# Patient Record
Sex: FEMALE | Race: WHITE | RURAL
[De-identification: names, ages, dates, MRNs, and addresses within clinical notes are randomized per-mention and may not be internally consistent; named-entity substitution may affect disease eponyms.]

---

## 2019-11-21 ENCOUNTER — OFFICE VISIT CONVERTED (OUTPATIENT)
Dept: CARDIOLOGY | Facility: CLINIC | Age: 74
End: 2019-11-21
Attending: SPECIALIST

## 2019-11-21 ENCOUNTER — CONVERSION ENCOUNTER (OUTPATIENT)
Dept: CARDIOLOGY | Facility: CLINIC | Age: 74
End: 2019-11-21

## 2020-01-17 ENCOUNTER — HOSPITAL ENCOUNTER (OUTPATIENT)
Dept: NUCLEAR MEDICINE | Facility: HOSPITAL | Age: 75
Discharge: HOME | End: 2020-01-17
Attending: SPECIALIST

## 2021-04-12 ENCOUNTER — CONVERSION ENCOUNTER (OUTPATIENT)
Dept: CARDIOLOGY | Facility: CLINIC | Age: 76
End: 2021-04-12
Attending: SPECIALIST

## 2021-05-11 NOTE — PROCEDURES
Procedure Note      Patient Name: Flower Lindsay   Patient ID: [id number]   Sex: Female   YOB: 1945    Primary Care Provider: Fly Maciel DO   Referring Provider: Fly Maciel DO    Visit Date: April 12, 2021    Provider: Phu Barnett MD   Location: [facility]   Location Address: [address] [place] [address]  [id number]   Location Phone: [phone]          FINAL REPORT   CAROTID DOPPLER EXAMINATION-LEFT AND RIGHT CAROTID SYSTEM    Diagnosis: Dizziness and TIA   Copy To: Fly Maciel DO   Tech: [facility]   CLASSIFICATION OF ICA STENOSIS    Normal: ICA PSV<125 cm/sec, ICA/CCA PSV Ratio: <2.0, ICA EDV <40 cm/sec (No Plaque)   <50% stenosis: ICA PSV <125 cm/sec, ICA/CCA PSV Ratio: <2.0, ICA EDV <40 cm/sec   50-69% stenosis: ICA -230 cm/sec, ICA/CCA PSV Ratio: <2.0-4.0, ICA EDV  cm/sec   >=70% Stenosis: ICA PSV >230 cm/sec, ICA/CCA PSV Ratio: >4.0, ICA EDV> 100 cm/sec   Near Occlusion: ICA PSV: High, Low, Undetectable, ICA/CCA PSV Ratio: Variable, ICA EDV: Variable   DUPLEX VELOCITY IN cm/sec  RIGHT:  PCCA: 82-19   MCCA: 69-16   DCCA: 63-18   PICA: 57-26   DAPHNE: 52-19   DICA: 90-25   ECA: 90-20   VERT: 37-12   LEFT:  PCCA: 117-19   MCCA: 75-14   DCCA: 75-17   PICA: 64-18   DAPHNE: 66-17   DICA: 46-10   ECA: 80-19   VERT: __   IMPRESSION:  The patient underwent bilateral carotid Doppler examination on 04/12/2021. The study is technically adequate. The following was observed:   B-mode imaging was performed. Common, internal, and external carotid arteries were identified. Imaging was of adequate quality. There was mild plaquing in the internal carotid arteries.   Doppler flow velocities and spectral analysis of flow in the common, internal, and external carotid arteries was performed. The velocities were in the normal range. No evidence of any stenosis was identified.   CONCLUSION:  No significant carotid stenosis. Mild plaquing  bilaterally in the carotid arteries.              Phu Barnett MD  CASANDRA/wt                 Electronically Signed by: Jessie Meeks-, -Author on April 14, 2021 10:53:19 AM  Electronically Co-signed by: Phu Barnett MD -Reviewer on April 29, 2021 09:52:59 AM

## 2021-05-15 VITALS
HEIGHT: 68 IN | HEART RATE: 68 BPM | WEIGHT: 239.12 LBS | SYSTOLIC BLOOD PRESSURE: 170 MMHG | DIASTOLIC BLOOD PRESSURE: 85 MMHG | BODY MASS INDEX: 36.24 KG/M2